# Patient Record
Sex: FEMALE | Race: WHITE | NOT HISPANIC OR LATINO | URBAN - METROPOLITAN AREA
[De-identification: names, ages, dates, MRNs, and addresses within clinical notes are randomized per-mention and may not be internally consistent; named-entity substitution may affect disease eponyms.]

---

## 2020-02-23 ENCOUNTER — EMERGENCY (EMERGENCY)
Facility: HOSPITAL | Age: 80
LOS: 1 days | Discharge: HOME | End: 2020-02-23
Attending: EMERGENCY MEDICINE
Payer: MEDICARE

## 2020-02-23 VITALS
SYSTOLIC BLOOD PRESSURE: 138 MMHG | RESPIRATION RATE: 18 BRPM | OXYGEN SATURATION: 99 % | TEMPERATURE: 99 F | DIASTOLIC BLOOD PRESSURE: 60 MMHG | HEART RATE: 85 BPM

## 2020-02-23 VITALS
OXYGEN SATURATION: 100 % | DIASTOLIC BLOOD PRESSURE: 74 MMHG | HEIGHT: 63 IN | WEIGHT: 139.99 LBS | TEMPERATURE: 98 F | RESPIRATION RATE: 18 BRPM | SYSTOLIC BLOOD PRESSURE: 164 MMHG | HEART RATE: 82 BPM

## 2020-02-23 PROCEDURE — 99283 EMERGENCY DEPT VISIT LOW MDM: CPT

## 2020-02-23 PROCEDURE — 73564 X-RAY EXAM KNEE 4 OR MORE: CPT | Mod: 26,RT

## 2020-02-23 PROCEDURE — 73564 X-RAY EXAM KNEE 4 OR MORE: CPT

## 2020-02-23 PROCEDURE — 99283 EMERGENCY DEPT VISIT LOW MDM: CPT | Mod: 25

## 2020-02-23 RX ORDER — CYCLOBENZAPRINE HYDROCHLORIDE 10 MG/1
10 TABLET, FILM COATED ORAL ONCE
Refills: 0 | Status: COMPLETED | OUTPATIENT
Start: 2020-02-23 | End: 2020-02-23

## 2020-02-23 RX ORDER — LIDOCAINE 4 G/100G
1 CREAM TOPICAL ONCE
Refills: 0 | Status: COMPLETED | OUTPATIENT
Start: 2020-02-23 | End: 2020-02-23

## 2020-02-23 RX ORDER — LIDOCAINE 4 G/100G
1 CREAM TOPICAL
Qty: 10 | Refills: 0
Start: 2020-02-23 | End: 2020-03-03

## 2020-02-23 RX ORDER — CYCLOBENZAPRINE HYDROCHLORIDE 10 MG/1
1 TABLET, FILM COATED ORAL
Qty: 15 | Refills: 0
Start: 2020-02-23

## 2020-02-23 RX ADMIN — CYCLOBENZAPRINE HYDROCHLORIDE 10 MILLIGRAM(S): 10 TABLET, FILM COATED ORAL at 12:01

## 2020-02-23 RX ADMIN — Medication 500 MILLIGRAM(S): at 12:01

## 2020-02-23 RX ADMIN — LIDOCAINE 1 PATCH: 4 CREAM TOPICAL at 12:01

## 2020-02-23 NOTE — ED ADULT TRIAGE NOTE - CHIEF COMPLAINT QUOTE
BIBA with c/o right knee pain. Patient notes she was doing house work yesterday and felt a pop to right knee swelling noted pain scale 10/10 denies any trauma

## 2020-02-23 NOTE — ED PROVIDER NOTE - PATIENT PORTAL LINK FT
You can access the FollowMyHealth Patient Portal offered by [facility] by registering at the following website: [email or web address]. By joining [facility]’s FollowMyHealth portal, you will also be able to view your health information using other applications (apps) compatible with our system.

## 2020-02-23 NOTE — ED PROVIDER NOTE - PROGRESS NOTE DETAILS
Spoke to  Maddy regarding patient's living situation. Spoke to  Maddy regarding patient's living situation. will evaluate

## 2020-02-23 NOTE — ED PROVIDER NOTE - CLINICAL SUMMARY MEDICAL DECISION MAKING FREE TEXT BOX
80 yo F with right knee injury after doing housework. X-ray negative for acute fx. Likely ligamentous/tendonous injury. Improved with analgesia. Offered admission for PT and rehab and declined. Seen by RAMESH regarding social living situation and stable for discharge. Patient placed in brace with improvement in mobility. Partially weightbearing with cane and knee brace. ortho referral made. Nontoxic and medically stable for discharge. Return precautions provided and patient understands to return to the ED for worsening signs and symptoms. Instructed to follow up with primary care physician/specialist and agreeable. Patient's questions answered and given copies of lab results.

## 2020-02-23 NOTE — ED PROCEDURE NOTE - CPROC ED POST PROC CARE GUIDE1
Instructed patient/caregiver regarding signs and symptoms of infection./Keep the cast/splint/dressing clean and dry./Verbal/written post procedure instructions were given to patient/caregiver. Instructed patient/caregiver regarding signs and symptoms of infection./Elevate the injured extremity as instructed./Verbal/written post procedure instructions were given to patient/caregiver./Keep the cast/splint/dressing clean and dry.

## 2020-02-23 NOTE — ED PROVIDER NOTE - OBJECTIVE STATEMENT
78 y/o F presents for R knee pain. Patient reports she was in a bent position making her bed and when she got up she felt a pop in her R knee. Patient reports increased swelling over the last day. Patient states she took Motrin with minimal relief. Patient relates to having difficulty ambulating and bearing weight on her R knee. Patient uses a cane to ambulate. 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 Patient's friends are concerned regarding her ability to take care of her . 80 y/o F presents for R knee pain. Patient reports she was in a bent position making her bed and when she got up she felt a pop in her R knee. Patient reports increased swelling over the last day. Patient states she took Motrin with minimal relief. Patient relates to having difficulty ambulating and bearing weight on her R knee. Patient uses a cane to ambulate. 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 Patient's friends are concerned regarding her ability to take care of her . Denies head injury, LOC, fevers, nausea, emesis, chest pain, shortness of breath, abdominal pain, dysuria, hematuria, diarrhea, constipation, or any other acute complaints.

## 2020-02-23 NOTE — ED PROVIDER NOTE - NSFOLLOWUPINSTRUCTIONS_ED_ALL_ED_FT
You were seen here today for your right knee injury. Please use your brace and cane and follow up with the orthopedic doctor. Please take your pain medications as prescribed. Please return to the Emergency Department for worsening signs or symptoms.

## 2020-02-23 NOTE — CHART NOTE - NSCHARTNOTEFT_GEN_A_CORE
On Call RAMESH consult requested to evaluate need for assistance at home. Patient is an 79 yr old female presenting in the ED with an injury to her R knee. Patient was evaluated medically , placed in an immobilizer and given a referral to see an Orthopedic Surgeon. Patient is cleared for DC to home. RAMESH met with patient and her friends at bedside. 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  A nurse comes to the home bi-weekly to due blood draws. No other home care services are in place. Patient is currently requesting assistance in caring for her  while she recovers from her injury. Discussed the options of private pay Home Care, Respite, Community volunteers and the possibility of applying for Medicaid for long term [facility] services. Patient will contact her husbands PCP tomorrow to discuss [facility] services.    No further Social Work services needed at this time.